# Patient Record
Sex: FEMALE | Race: ASIAN | Employment: FULL TIME | URBAN - METROPOLITAN AREA
[De-identification: names, ages, dates, MRNs, and addresses within clinical notes are randomized per-mention and may not be internally consistent; named-entity substitution may affect disease eponyms.]

---

## 2019-07-15 ENCOUNTER — OFFICE VISIT (OUTPATIENT)
Dept: URGENT CARE | Facility: URGENT CARE | Age: 34
End: 2019-07-15
Payer: COMMERCIAL

## 2019-07-15 VITALS
DIASTOLIC BLOOD PRESSURE: 62 MMHG | HEART RATE: 87 BPM | OXYGEN SATURATION: 99 % | TEMPERATURE: 98.7 F | WEIGHT: 118.9 LBS | SYSTOLIC BLOOD PRESSURE: 90 MMHG
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
## 2019-07-15 PROCEDURE — 99213 OFFICE O/P EST LOW 20 MIN: CPT | Performed by: FAMILY MEDICINE

## 2019-07-15 RX ORDER — TRIAMCINOLONE ACETONIDE 1 MG/G
CREAM TOPICAL 3 TIMES DAILY
Qty: 30 G | Refills: 0 | Status: SHIPPED | OUTPATIENT
Start: 2019-07-15 | End: 2019-07-25

## 2019-07-15 RX ORDER — CETIRIZINE HYDROCHLORIDE 10 MG/1
10 TABLET ORAL DAILY
Qty: 10 TABLET | Refills: 0 | Status: SHIPPED | OUTPATIENT
Start: 2019-07-15 | End: 2019-07-25

## 2019-07-16 NOTE — PROGRESS NOTES
SUBJECTIVE:Candace Stephenson is a 33 year old female who presents to the clinic today for a itchy rash.  Onset of rash was day(s) ago.   Rash is still present.   Location of the rash: lower leg.  Associated symptoms include: itching.    Symptoms are mild and rash seems to be stable.  Therapies tried to improve the rash: OTC Antihistamines hydrocortisone and claritin.  Previous history of a similar rash? No  Recent exposure history: mosquitos    Also 1 month ago rash on arms and face after changing her soap    No past medical history on file.  No Known Allergies  Social History     Tobacco Use     Smoking status: Never Smoker     Smokeless tobacco: Never Used   Substance Use Topics     Alcohol use: Not on file       ROS:CONSTITUTIONAL:NEGATIVE for fever, chills, change in weight    EXAM: VITALS: BP 90/62   Pulse 87   Temp 98.7  F (37.1  C) (Tympanic)   Wt 53.9 kg (118 lb 14.4 oz)   SpO2 99%   General:healthy,alert,no distress    Location: lower leg     Distribution: diffuse/patchy     Lesion grouping: bilateral     Lesion type: papular     Color: red with no other findings      ICD-10-CM    1. Bug bite, initial encounter W57.XXXA triamcinolone (KENALOG) 0.1 % external cream     cetirizine (ZYRTEC) 10 MG tablet     Suspect p[apules on lower legs from mosquito bites  Change soap back to the soap she used b4 and if cont will need to see Derm.  Follow-up with primary clinic if not improving

## 2020-03-11 ENCOUNTER — OFFICE VISIT (OUTPATIENT)
Dept: URGENT CARE | Facility: URGENT CARE | Age: 35
End: 2020-03-11
Payer: COMMERCIAL

## 2020-03-11 VITALS
OXYGEN SATURATION: 97 % | WEIGHT: 118 LBS | TEMPERATURE: 98.2 F | DIASTOLIC BLOOD PRESSURE: 50 MMHG | HEART RATE: 79 BPM | SYSTOLIC BLOOD PRESSURE: 98 MMHG

## 2020-03-11 DIAGNOSIS — H00.012 HORDEOLUM EXTERNUM OF RIGHT LOWER EYELID: Primary | ICD-10-CM

## 2020-03-11 PROCEDURE — 99213 OFFICE O/P EST LOW 20 MIN: CPT | Performed by: INTERNAL MEDICINE

## 2020-03-11 RX ORDER — NEOMYCIN SULFATE, POLYMYXIN B SULFATE AND GRAMICIDIN 1.75; 10000; .025 MG/ML; [USP'U]/ML; MG/ML
1-2 SOLUTION/ DROPS OPHTHALMIC 4 TIMES DAILY
Qty: 10 ML | Refills: 0 | Status: SHIPPED | OUTPATIENT
Start: 2020-03-11

## 2020-03-12 NOTE — PATIENT INSTRUCTIONS
Patient Education     Sty (or Stye)  A sty is an infection of the oil gland of the eyelid. It may develop into a small pocket of pus (an abscess). This can cause pain, redness, and swelling. In early stages, a sty is treated with antibiotic cream, eye drops, or a small towel soaked in warm water (a warm compress). More severe cases may need to be opened and drained by a healthcare provider.  Home care    Eye drops or ointment are usually prescribed to treat the infection. Use these as directed.     Artificial tears may also be used to lubricate the eye and make it more comfortable. You can buy these over the counter without a prescription. Talk with your healthcare provider before using any over-the-counter treatment for a sty.    Apply a warm, damp towel to the affected eye for at least 5 minutes, 3 to 4 times a day for a week. Warm compresses open the pores and speed the healing. But if the compresses are too hot, they may burn your eyelid.    Sometimes the sty will drain with this treatment alone. If this happens, keep using the antibiotic until all the redness and swelling are gone.    Wash your hands before and after touching the infected eyelid to avoid spreading the infection.    Don t squeeze or try to break open the sty.  Follow-up care  Follow up with your healthcare provider, or as advised.   When to seek medical advice  Call your healthcare provider right away if any of these occur:    Increase in swelling or redness around the eyelid after 48 to 72 hours    Increase in eye pain or the eyelid blisters    Increase in warmth--the eyelid feels hot    Drainage of blood or thick pus from the sty    Blister on the eyelid    Inability to open the eyelid due to swelling    Fever of 100.4 F (38 C) or above, or as directed by your provider    Vision changes    Headache or stiff neck    The sty comes back  Date Last Reviewed: 8/1/2017 2000-2019 [employer]. [address], [place]  [address]. All rights reserved. This information is not intended as a substitute for professional medical care. Always follow your healthcare professional's instructions.

## 2020-03-12 NOTE — PROGRESS NOTES
SUBJECTIVE: Candace Stephenson is a 34 year old female who complains of a right lower eyelid stye for 3 days. No fever, chills, no URI symptoms, no history of foreign body in the eye. Vision has been normal.    OBJECTIVE: She appears well, vitals are normal. Hordeolum noted right lower eyelid. LAMIN, fundi normal. Visual acuity per Nurse's note. Ears, throat normal, no periorbital cellulitis, no neck lymphadenopathy.    ASSESSMENT: stye/hordeolum    PLAN: Frequent warm soaks, use antibiotic ophthalmic ointment as prescribed, and follow up if symptoms persist or worsen. It may take several days for this to resolve. Rarely, these persist or enlarge and in that event, she will need to see an Ophthalmologist. Patient agrees with the medical treatment plan.    Chevy Ulloa MD

## 2021-03-30 ENCOUNTER — HOSPITAL ENCOUNTER (EMERGENCY)
Facility: CLINIC | Age: 36
Discharge: HOME | End: 2021-03-30
Attending: EMERGENCY MEDICINE | Admitting: EMERGENCY MEDICINE
Payer: COMMERCIAL

## 2021-03-30 VITALS
BODY MASS INDEX: 19.83 KG/M2 | OXYGEN SATURATION: 100 % | WEIGHT: 119.05 LBS | HEART RATE: 88 BPM | RESPIRATION RATE: 16 BRPM | TEMPERATURE: 98.2 F | HEIGHT: 65 IN | SYSTOLIC BLOOD PRESSURE: 98 MMHG | DIASTOLIC BLOOD PRESSURE: 52 MMHG

## 2021-03-30 DIAGNOSIS — T65.91XA INGESTION OF SUBSTANCE, ACCIDENTAL OR UNINTENTIONAL, INITIAL ENCOUNTER: ICD-10-CM

## 2021-03-30 LAB
ANION GAP SERPL CALCULATED.3IONS-SCNC: <1 MMOL/L (ref 3–14)
BUN SERPL-MCNC: 12 MG/DL (ref 7–30)
CALCIUM SERPL-MCNC: 8.8 MG/DL (ref 8.5–10.1)
CHLORIDE SERPL-SCNC: 108 MMOL/L (ref 94–109)
CO2 SERPL-SCNC: 31 MMOL/L (ref 20–32)
CREAT SERPL-MCNC: 0.6 MG/DL (ref 0.52–1.04)
ERYTHROCYTE [DISTWIDTH] IN BLOOD BY AUTOMATED COUNT: 12.4 % (ref 10–15)
GFR SERPL CREATININE-BSD FRML MDRD: >90 ML/MIN/{1.73_M2}
GLUCOSE SERPL-MCNC: 90 MG/DL (ref 70–99)
HCT VFR BLD AUTO: 38.1 % (ref 35–47)
HGB BLD-MCNC: 12.5 G/DL (ref 11.7–15.7)
INTERPRETATION ECG - MUSE: NORMAL
MCH RBC QN AUTO: 29.3 PG (ref 26.5–33)
MCHC RBC AUTO-ENTMCNC: 32.8 G/DL (ref 31.5–36.5)
MCV RBC AUTO: 89 FL (ref 78–100)
PLATELET # BLD AUTO: 252 10E9/L (ref 150–450)
POTASSIUM SERPL-SCNC: 3.8 MMOL/L (ref 3.4–5.3)
RBC # BLD AUTO: 4.27 10E12/L (ref 3.8–5.2)
SODIUM SERPL-SCNC: 139 MMOL/L (ref 133–144)
WBC # BLD AUTO: 6.7 10E9/L (ref 4–11)

## 2021-03-30 PROCEDURE — 80048 BASIC METABOLIC PNL TOTAL CA: CPT | Performed by: NURSE PRACTITIONER

## 2021-03-30 PROCEDURE — 96361 HYDRATE IV INFUSION ADD-ON: CPT

## 2021-03-30 PROCEDURE — 96374 THER/PROPH/DIAG INJ IV PUSH: CPT

## 2021-03-30 PROCEDURE — 258N000003 HC RX IP 258 OP 636: Performed by: NURSE PRACTITIONER

## 2021-03-30 PROCEDURE — 93005 ELECTROCARDIOGRAM TRACING: CPT

## 2021-03-30 PROCEDURE — 99284 EMERGENCY DEPT VISIT MOD MDM: CPT | Mod: 25

## 2021-03-30 PROCEDURE — 250N000011 HC RX IP 250 OP 636: Performed by: NURSE PRACTITIONER

## 2021-03-30 PROCEDURE — 85027 COMPLETE CBC AUTOMATED: CPT | Performed by: NURSE PRACTITIONER

## 2021-03-30 RX ORDER — ONDANSETRON 2 MG/ML
4 INJECTION INTRAMUSCULAR; INTRAVENOUS ONCE
Status: COMPLETED | OUTPATIENT
Start: 2021-03-30 | End: 2021-03-30

## 2021-03-30 RX ADMIN — SODIUM CHLORIDE 1000 ML: 9 INJECTION, SOLUTION INTRAVENOUS at 16:25

## 2021-03-30 RX ADMIN — ONDANSETRON 4 MG: 2 INJECTION INTRAMUSCULAR; INTRAVENOUS at 16:30

## 2021-03-30 ASSESSMENT — ENCOUNTER SYMPTOMS
HEADACHES: 1
PALPITATIONS: 1
VOMITING: 0
NAUSEA: 1
DIZZINESS: 0
DIARRHEA: 1
LIGHT-HEADEDNESS: 0

## 2021-03-30 ASSESSMENT — MIFFLIN-ST. JEOR: SCORE: 1219

## 2021-03-30 NOTE — ED NOTES
Bed: ED15  Expected date: 3/30/21  Expected time: 3:25 PM  Means of arrival:   Comments:  Hold for Fast track

## 2021-03-30 NOTE — ED TRIAGE NOTES
Pt ingested death vinicius sam by accident at 12:00 . All 9 of her coherts are nauseated and some have thrown up as well as loose stools .

## 2021-03-30 NOTE — ED PROVIDER NOTES
"History     Chief Complaint:  Ingestion       The history is provided by the patient.     Candace Stephenson is a 35 year old female who presents for evaluation after ingestion. The patient accidentally ate death jc for lunch today at 1300 which were mistaken for wild onions when picked from her garden. Since ingestion, she has had three episodes of diarrhea. She also reports a headache, nausea, and one episode of palpitations. She denies vomiting, dizziness, or lightheadedness.       Review of Systems   Cardiovascular: Positive for palpitations.   Gastrointestinal: Positive for diarrhea and nausea. Negative for vomiting.   Neurological: Positive for headaches. Negative for dizziness and light-headedness.   All other systems reviewed and are negative.      Allergies:  No Known Drug Allergies      Medications:   Levothyroxine     Medical History:   History reviewed. The patient denies any medical history.      Social History:  The patient was unaccompanied to the ED.  The patient is a [employer].       Physical Exam     Patient Vitals for the past 24 hrs:   BP Temp Temp src Pulse Resp SpO2 Height Weight   03/30/21 1438 98/66 -- -- -- -- -- -- --   03/30/21 1437 -- 98.2  F (36.8  C) Temporal 82 16 95 % 1.64 m (5' 4.57\") 54 kg (119 lb 0.8 oz)          Physical Exam  General: Alert, No obvious discomfort, well kept   HENT:  Normal voice, No lymphadenopathy  Eyes:  The pupils are equal, round, and reactive to light, Conjunctiva normal, No scleral icterus   Neck:  Normal range of motion  CV:  Normal Pulses  Resp:  Non-labored, No cough  MS:  Normal muscular tone, moves all extremities  Skin:  No rash or acute skin lesions noted  Neuro:  Speech is normal and fluent  Psych: Awake. Alert.  Normal affect.  Appropriate interactions. Good eye contact      Emergency Department Course     ECG:  ECG taken at 1511, ECG read at 1515  Normal sinus rhythm   Left posterior fascicular block   Abnormal ECG   Rate 78 bpm. KS interval 184 " "ms. QRS duration 82 ms. QT/QTc 378/430 ms. P-R-T axes 69 110 62.     Laboratory:    CBC: WBC 6.7, HGB 12.5,   BMP: Anion Gap <1 (L), o/w WNL (Creatinine 0.60)     Emergency Department Course:    Reviewed:  I reviewed the patient's nursing notes, vitals, past medical records, Care Everywhere.     Assessments:  1520 I performed an exam of the patient, as documented above.   1703 Patient rechecked and updated. She is feeling improved and passed a PO challenge here in the ED. She is in agreement with the plan for discharge.     Consults:   1610 I spoke with poison control who recommended observing the patient until four hours after ingestion.     Interventions:  1625 Normal Saline 1000 mL IV   1630 Zofran 4 mg IV     Disposition:  The patient was discharged to home.     Impression & Plan     Medical Decision Making:  Candace Stephenson is a 35 year old female who presents today for evaluation of abdominal pain, nausea, and diarrhea after inadvertently eating a wild \"Death Sue.\"  Patient and her fellow sisters were walking in the field behind the house when they found what they thought were wild onions.  They picked 3 bulbs and used them in a stirfry.  Shortly after ingesting the stirfry patient and mild abd pain and diarrhea (\"very loose stools\").  The 8 other sisters were having abdominal pain and nausea/vomiting.  After they realized their mistake with the plan today presented here for evaluation.  After discussing toxicity and monitoring with poison control they were observed for a total of greater than 4 hours after the ingestion and were treated as above.  No blood in stool.  Symptoms resolved.  She tolerated p.o. intake without difficulty.  Laboratory studies were noncontributory.  There was no indication for further testing or imagery.  No indication for further observation.  Discussion of bland diet for the next 24 to 48 hours.  Return protocols discussed.  She appears to be safe and appropriate for " outpatient management follow-up and or discharged home.        Diagnosis:     ICD-10-CM    1. Ingestion of substance, accidental or unintentional, initial encounter  T65.91XA CBC (+ platelets, no diff)     Basic metabolic panel        Scribe Disclosure:  I, Shayy Pollock, am serving as a scribe at 3:39 PM on 3/30/2021 to document services personally performed by Deion Luna APRN based on my observations and the provider's statements to me.      Deion Luna APRN CNP  03/30/21 1732